# Patient Record
Sex: FEMALE | Race: WHITE | NOT HISPANIC OR LATINO | Employment: FULL TIME | URBAN - METROPOLITAN AREA
[De-identification: names, ages, dates, MRNs, and addresses within clinical notes are randomized per-mention and may not be internally consistent; named-entity substitution may affect disease eponyms.]

---

## 2018-01-05 ENCOUNTER — TELEPHONE (OUTPATIENT)
Dept: URGENT CARE | Facility: PHYSICIAN GROUP | Age: 28
End: 2018-01-05

## 2018-01-05 ENCOUNTER — OFFICE VISIT (OUTPATIENT)
Dept: URGENT CARE | Facility: PHYSICIAN GROUP | Age: 28
End: 2018-01-05

## 2018-01-05 VITALS
OXYGEN SATURATION: 97 % | DIASTOLIC BLOOD PRESSURE: 78 MMHG | SYSTOLIC BLOOD PRESSURE: 120 MMHG | TEMPERATURE: 99.5 F | RESPIRATION RATE: 16 BRPM | HEART RATE: 70 BPM

## 2018-01-05 DIAGNOSIS — R19.7 DIARRHEA, UNSPECIFIED TYPE: ICD-10-CM

## 2018-01-05 DIAGNOSIS — R11.0 NAUSEA: ICD-10-CM

## 2018-01-05 PROCEDURE — 99203 OFFICE O/P NEW LOW 30 MIN: CPT | Performed by: FAMILY MEDICINE

## 2018-01-05 RX ORDER — DEXTROAMPHETAMINE SACCHARATE, AMPHETAMINE ASPARTATE, DEXTROAMPHETAMINE SULFATE AND AMPHETAMINE SULFATE 2.5; 2.5; 2.5; 2.5 MG/1; MG/1; MG/1; MG/1
5 TABLET ORAL 2 TIMES DAILY
COMMUNITY

## 2018-01-05 RX ORDER — ONDANSETRON 4 MG/1
4 TABLET, ORALLY DISINTEGRATING ORAL EVERY 8 HOURS PRN
Qty: 6 TAB | Refills: 0 | Status: SHIPPED | OUTPATIENT
Start: 2018-01-05 | End: 2023-01-09

## 2018-01-05 NOTE — PROGRESS NOTES
Subjective:      Cristy Suero is a 27 y.o. female who presents with Cough (cough x2 wks)            2 weeks initial GI symptoms that have waxed and waned. +nausea, + diarrhea without blood in stool. Cough has improved. Nasal congestion has worsened recently. Intermittent subjective fever. Minimal relief with OTC medications. No other aggravating or alleviating factors.        No recent foreign travel, abx, or drinking from streams.     Review of Systems   Constitutional: Positive for malaise/fatigue. Negative for weight loss.   HENT: Negative for ear discharge and ear pain.    Eyes: Negative for discharge and redness.   Respiratory: Negative for shortness of breath and wheezing.    Gastrointestinal: Negative for abdominal pain.   Genitourinary: Negative for dysuria, frequency, hematuria and urgency.   Skin: Negative for itching and rash.     .  Medications, Allergies, and current problem list reviewed today in Epic       Objective:     /78   Pulse 70   Temp 37.5 °C (99.5 °F)   Resp 16   SpO2 97%      Physical Exam   Constitutional: She appears well-developed and well-nourished. No distress.   HENT:   Head: Normocephalic and atraumatic.   Right Ear: External ear normal.   Left Ear: External ear normal.   Nasal congestion     Eyes: Conjunctivae are normal.   Neck: Neck supple.   Cardiovascular: Normal rate, regular rhythm and normal heart sounds.    Pulmonary/Chest: Effort normal and breath sounds normal.   Abdominal: Soft. Bowel sounds are normal. There is no tenderness.   Lymphadenopathy:     She has no cervical adenopathy.   Neurological:   Speech is clear. Patient is appropriate and cooperative.     Skin: Skin is warm and dry. No rash noted.               Assessment/Plan:     1. Nausea  ondansetron (ZOFRAN ODT) 4 MG TABLET DISPERSIBLE   2. Diarrhea, unspecified type       Differential diagnosis, natural history, supportive care, and indications for immediate follow-up discussed at length.   Imodium  prn  ORT with pedialyte discussed

## 2018-01-05 NOTE — LETTER
January 9, 2018         Patient: Cristy Suero   YOB: 1990   Date of Visit: 1/5/2018           To Whom it May Concern:    Cristy Suero was seen in my clinic on 1/5/2018. I have been asked by Ms. Suero to provide further details of her illness in a letter. She has had diarrhea as well as upper respiratory symptoms. There is a high probability that these symptoms are caused by one of many viruses that are circulating in our community including influenza and norovirus. She is considered contagious until she has not had diarrhea or fever for at least 24 hours. Further, given these symptoms, I believe a long flight such as that scheduled would be very difficult to endure for her. In the interest of her comfort and the safety of the other passengers I would recommend no flying until the guidelines above are reached. I believe that postponing the flight until 1/12/2018 should give ample time for the symptoms to resolve.       Sincerely,           Ricardo Rodney M.D.  Electronically Signed

## 2018-01-05 NOTE — LETTER
January 5, 2018         Patient: Cristy Suero   YOB: 1990   Date of Visit: 1/5/2018           To Whom it May Concern:    Cristy Suero was seen in my clinic on 1/5/2018. No flying until 1/12/2018.      Sincerely,           Ricardo Rodney M.D.  Electronically Signed

## 2018-01-06 NOTE — TELEPHONE ENCOUNTER
This patient called for a note detailing why she is unable to fly. She states that her airline requests a diagnosis or more thorough explanation in her note. She also wonders if she is able to get progress notes from her visit? Thanks.

## 2018-01-12 ASSESSMENT — ENCOUNTER SYMPTOMS
ABDOMINAL PAIN: 0
WEIGHT LOSS: 0
EYE DISCHARGE: 0
WHEEZING: 0
SHORTNESS OF BREATH: 0
EYE REDNESS: 0

## 2023-01-09 ENCOUNTER — OFFICE VISIT (OUTPATIENT)
Dept: MEDICAL GROUP | Facility: MEDICAL CENTER | Age: 33
End: 2023-01-09
Payer: COMMERCIAL

## 2023-01-09 ENCOUNTER — HOSPITAL ENCOUNTER (OUTPATIENT)
Facility: MEDICAL CENTER | Age: 33
End: 2023-01-09
Attending: BEHAVIOR ANALYST
Payer: COMMERCIAL

## 2023-01-09 VITALS
HEIGHT: 66 IN | HEART RATE: 49 BPM | BODY MASS INDEX: 28.67 KG/M2 | OXYGEN SATURATION: 95 % | TEMPERATURE: 97.8 F | WEIGHT: 178.4 LBS | SYSTOLIC BLOOD PRESSURE: 94 MMHG | DIASTOLIC BLOOD PRESSURE: 68 MMHG | RESPIRATION RATE: 20 BRPM

## 2023-01-09 DIAGNOSIS — F41.8 DEPRESSION WITH ANXIETY: ICD-10-CM

## 2023-01-09 DIAGNOSIS — Z23 NEED FOR VACCINATION: ICD-10-CM

## 2023-01-09 DIAGNOSIS — Z00.00 WELLNESS EXAMINATION: ICD-10-CM

## 2023-01-09 DIAGNOSIS — R87.810 CERVICAL HIGH RISK HUMAN PAPILLOMAVIRUS (HPV) DNA TEST POSITIVE: ICD-10-CM

## 2023-01-09 DIAGNOSIS — Z11.51 SCREENING FOR HPV (HUMAN PAPILLOMAVIRUS): ICD-10-CM

## 2023-01-09 DIAGNOSIS — Z12.4 SCREENING FOR CERVICAL CANCER: ICD-10-CM

## 2023-01-09 DIAGNOSIS — L98.9 BUMPS ON SKIN: ICD-10-CM

## 2023-01-09 DIAGNOSIS — Z30.09 ENCOUNTER FOR COUNSELING REGARDING CONTRACEPTION: ICD-10-CM

## 2023-01-09 DIAGNOSIS — Z01.419 CERVICAL SMEAR, AS PART OF ROUTINE GYNECOLOGICAL EXAMINATION: ICD-10-CM

## 2023-01-09 DIAGNOSIS — O03.9 MISCARRIAGE: ICD-10-CM

## 2023-01-09 DIAGNOSIS — F98.8 ATTENTION DEFICIT DISORDER, UNSPECIFIED HYPERACTIVITY PRESENCE: ICD-10-CM

## 2023-01-09 LAB — AMBIGUOUS DTTM AMBI4: NORMAL

## 2023-01-09 PROCEDURE — 88175 CYTOPATH C/V AUTO FLUID REDO: CPT

## 2023-01-09 PROCEDURE — 87591 N.GONORRHOEAE DNA AMP PROB: CPT

## 2023-01-09 PROCEDURE — 90471 IMMUNIZATION ADMIN: CPT | Performed by: BEHAVIOR ANALYST

## 2023-01-09 PROCEDURE — 90686 IIV4 VACC NO PRSV 0.5 ML IM: CPT | Performed by: BEHAVIOR ANALYST

## 2023-01-09 PROCEDURE — 87491 CHLMYD TRACH DNA AMP PROBE: CPT

## 2023-01-09 PROCEDURE — 99204 OFFICE O/P NEW MOD 45 MIN: CPT | Mod: 25 | Performed by: BEHAVIOR ANALYST

## 2023-01-09 PROCEDURE — 87624 HPV HI-RISK TYP POOLED RSLT: CPT

## 2023-01-09 RX ORDER — SERTRALINE HYDROCHLORIDE 100 MG/1
200 TABLET, FILM COATED ORAL DAILY
COMMUNITY

## 2023-01-09 RX ORDER — BUPROPION HYDROCHLORIDE 150 MG/1
150 TABLET, EXTENDED RELEASE ORAL 2 TIMES DAILY
COMMUNITY

## 2023-01-09 ASSESSMENT — ENCOUNTER SYMPTOMS
DEPRESSION: 1
NERVOUS/ANXIOUS: 1
CHILLS: 0
INSOMNIA: 0
FEVER: 0

## 2023-01-09 ASSESSMENT — PATIENT HEALTH QUESTIONNAIRE - PHQ9: CLINICAL INTERPRETATION OF PHQ2 SCORE: 0

## 2023-01-09 NOTE — LETTER
[facility]  MARIUM Gillis.  [address]  [place] [address]  Fax: [phone]   Authorization for Release/Disclosure of   Protected Health Information   Name: ARNALDO GORDON : 1990 SSN: [id number]   Address: [facility] [place] [address] Phone:    [phone] (home) [phone] (work)   I authorize the entity listed below to release/disclose the PHI below to:   [facility]/JAIRON Gillis and JAIRON Gillis   Provider or Entity Name:     Address   City, State, Zip   Phone:      Fax:     Reason for request: continuity of care   Information to be released:    [  ] LAST COLONOSCOPY,  including any PATH REPORT and follow-up  [  ] LAST FIT/COLOGUARD RESULT [  ] LAST DEXA  [  ] LAST MAMMOGRAM  [  ] LAST PAP  [  ] LAST LABS [  ] RETINA EXAM REPORT  [  ] IMMUNIZATION RECORDS  [  ] Release all info      [  ] Check here and initial the line next to each item to release ALL health information INCLUDING  _____ Care and treatment for drug and / or alcohol abuse  _____ HIV testing, infection status, or AIDS  _____ Genetic Testing    DATES OF SERVICE OR TIME PERIOD TO BE DISCLOSED: _____________  I understand and acknowledge that:  * This Authorization may be revoked at any time by you in writing, except if your health information has already been used or disclosed.  * Your health information that will be used or disclosed as a result of you signing this authorization could be re-disclosed by the recipient. If this occurs, your re-disclosed health information may no longer be protected by State or Federal laws.  * You may refuse to sign this Authorization. Your refusal will not affect your ability to obtain treatment.  * This Authorization becomes effective upon signing and will  on (date) __________.      If no date is indicated, this Authorization will  one (1) year from the signature date.    Name: Arnaldo Gordon    Signature:   Date:      1/9/2023       PLEASE FAX REQUESTED RECORDS BACK TO: [phone]

## 2023-01-09 NOTE — PROGRESS NOTES
Subjective:     CC:   Chief Complaint   Patient presents with    [facility]     Discuss contraceptive, just miscarried 22 at 4 weeks, requesting a pap today       HPI:   Cristy Daniels is a 32 y.o. female with history of ADD and depression who presents to [facility] and have a pelvic exam.  She also requests a Pap smear today.    Problem   Miscarriage    She reports finding out she was pregnant 12/10, a few days less than 4 weeks after her LMP.  Pregnancy was confirmed by multiple home positive pregnancy test. About one week later,, she started bleeding and knew she miscarried. She tracks her periods very closely and has consistent length between cycles.   She had scheduled an appointment for [place] for an , however, by the time of her appointment she had already miscarried. She did still go to the Women's health clinic and an ultrasound was performed with no signs of pregnancy.   She reports continued bleeding, similar to her period flow, for a full two week. She stopped bleeding a few days ago.      Encounter for Counseling Regarding Contraception    She admits that she is not good about taking a pill daily.  She is interested in other forms of contraception.  She is not interested in IUD as she is afraid of potential complications.  She is also not interested in a vaginal ring or skin patch.  She is most interested in Nexplanon as she has heard good things about it from her friends.      Bumps On Skin    Three weeks ago she noticed 1 bump on her labia.  She then noted a few more over the next week.  The bumps have decreased in size now and she has not had any new bumps over the past 2 weeks.   Denies pain, discharge or itching.  She did her pubic area waxed just before she noticed the first bumps.  Denies history of vaginal warts or herpes.     Cervical high risk HPV (human papillomavirus) test positive    She states she has not had a Pap smear in about 8 years.  Reports that previously  cervical cancer screenings have been positive for HPV.  However, after her last Pap smear she was told she would not need another PAP for 3 to 5 years.      Depression With Anxiety    This is a chronic condition.  She has been on medications for many years  Current treatment with sertraline and wellbutrin for the last 5 years.   PHQ screen:  0  Suicidal ideation/homicidal ideation: none  Therapy/counseling: would like to start.  Requesting a referral now that she has better insurance.   Medications: 100 mg sertraline daily and 50 mg wellbutrin twice daily  Improving/worsening: She reports she is very stable.        Add (Attention Deficit Disorder)    Diagnosed as a child around 9 years old.   Taking Adderall about 2x a month-when she needs to stay focused to complete something. Was taking more previously when she was in school.  Helps with focus.   The Adderall has been prescribed by her previous PCP at [facility].     Insomnia (Resolved)            She  has a past medical history of Depression, Insomnia (8/11/2011), and Miscarriage (12/17/2022).  She  has a past surgical history that includes tonsillectomy.    Family History   Problem Relation Age of Onset    No Known Problems Mother     Hypertension Father     Psychiatric Illness Sister     Psychiatric Illness Sister     Psychiatric Illness Brother         depression    Diabetes Brother         type 1    Suicide Attempts Brother     Suicide Attempts Brother     Breast Cancer Neg Hx     Colorectal Cancer Neg Hx     Ovarian Cancer Neg Hx     Tubal Cancer Neg Hx     Peritoneal Cancer Neg Hx        Social History     Socioeconomic History    Marital status: Single     Spouse name: Not on file    Number of children: Not on file    Years of education: Not on file    Highest education level: Not on file   Occupational History    Not on file   Tobacco Use    Smoking status: Never    Smokeless tobacco: Never   Vaping Use    Vaping Use: Never used  "  Substance and Sexual Activity    Alcohol use: Yes     Comment: Socially    Drug use: No    Sexual activity: Yes     Partners: Male     Birth control/protection: Condom     Comment: in relationship x 3 years   Other Topics Concern    Not on file   Social History Narrative    Not on file     Social Determinants of Health     Financial Resource Strain: Not on file   Food Insecurity: Not on file   Transportation Needs: Not on file   Physical Activity: Not on file   Stress: Not on file   Social Connections: Not on file   Intimate Partner Violence: Not on file   Housing Stability: Not on file       Patient Active Problem List    Diagnosis Date Noted    Miscarriage 01/09/2023    Encounter for counseling regarding contraception 01/09/2023    Bumps on skin 01/09/2023    Cervical high risk HPV (human papillomavirus) test positive 01/30/2012    Abnormal finding on Pap smear, ASCUS 01/30/2012    Trichomonas exposure 01/30/2012    Depression with anxiety 08/11/2011    ADD (attention deficit disorder) 10/11/2010         Current Outpatient Medications   Medication Sig Dispense Refill    sertraline (ZOLOFT) 100 MG Tab Take 200 mg by mouth every day.      buPROPion SR (WELLBUTRIN-SR) 150 MG TABLET SR 12 HR sustained-release tablet Take 150 mg by mouth 2 times a day.      amphetamine-dextroamphetamine (ADDERALL) 10 MG Tab Take 5 mg by mouth 2 times a day.       No current facility-administered medications for this visit.     No Known Allergies    Review of Systems   Review of Systems   Constitutional:  Negative for chills and fever.   Cardiovascular:  Negative for chest pain.   Genitourinary:         Bumps on labia   Psychiatric/Behavioral:  Positive for depression. The patient is nervous/anxious. The patient does not have insomnia.       Objective:     BP 94/68 (BP Location: Left arm, Patient Position: Sitting, BP Cuff Size: Adult long)   Pulse (!) 49   Temp 36.6 °C (97.8 °F) (Temporal)   Resp 20   Ht 1.676 m (5' 6\") Comment: " Pt reported  Wt 80.9 kg (178 lb 6.4 oz)   LMP 2022   SpO2 95%   BMI 28.79 kg/m²   Body mass index is 28.79 kg/m².      Physical Exam:  Physical Exam  Constitutional:       Appearance: Normal appearance.   Cardiovascular:      Rate and Rhythm: Normal rate and regular rhythm.      Pulses: Normal pulses.      Heart sounds: Normal heart sounds.   Pulmonary:      Effort: Pulmonary effort is normal.      Breath sounds: Normal breath sounds.   Musculoskeletal:      Cervical back: Normal range of motion and neck supple.   Neurological:      Mental Status: She is alert.   : Perineum and external genitalia normal without rash. No visualized lesions or warts. A few bumps under skin that are normal/physiologic for that tissue, most likely hair follicles. Vagina with scant discharge. Cervix without visible lesions or discharge. Bimanual exam without adnexal masses or cervical motion tenderness.    A chaperone was offered to the patient during today's exam. Chaperone name: Opal OCONNELL was present.    Assessment and Plan:     1. Need for vaccination  INFLUENZA VACCINE QUAD INJ (PF)      2. Miscarriage        3. Attention deficit disorder, unspecified hyperactivity presence  Referral to Psychology      4. Depression with anxiety  Referral to Psychology      5. Encounter for counseling regarding contraception        6. Bumps on skin        7. Wellness examination  TSH WITH REFLEX TO FT4    T.PALLIDUM AB ESTEPHANIE (SCREENING)    CBC WITHOUT DIFFERENTIAL    Comp Metabolic Panel    Lipid Profile    HIV AG/AB COMBO ASSAY SCREENING    HCV Scrn ( 4930-9258 1xLife)      8. Screening for cervical cancer  THINPREP PAP W/HPV AND CTNG      9. Screening for HPV (human papillomavirus)  THINPREP PAP W/HPV AND CTNG      10. Cervical smear, as part of routine gynecological examination  THINPREP PAP W/HPV AND CTNG      11. Cervical high risk HPV (human papillomavirus) test positive            1. Need for vaccination  Pt desires vaccination.   Risks and benefits discussed.  No contraindications today.  Vaccine given.  Follow-up precautions discussed.  - INFLUENZA VACCINE QUAD INJ (PF)    2. Miscarriage  -Patient reports symptoms of complete miscarriage without complication.  - Focusing on initiating contraception as she does not wish to have a pregnancy.    3. Attention deficit disorder, unspecified hyperactivity presence  - Chronic, stable with occasional Adderall use.  - Referral to Psychology    4. Depression with anxiety  - Chronic, in partial remission.  She denies any symptoms of depression or SI/HI today.  Therefore, we will continue the current regimen.  She is interested in getting started with counseling.  - Referral to Psychology    5. Encounter for counseling regarding contraception  -After discussing various methods of contraception the Nexplanon does seem to be a good fit for her.  She will be scheduled with one of my colleagues, Dr. Spencer, for consultation.   -She was advised to avoid unprotected sex until adequate contraception method is obtained.     6. Bumps on skin  -Physiologic bumps on labia not associated with any concerning lesions or warts.    7. Wellness examination  -She states she has not had wellness labs in several years.  She also requests STD testing.   - TSH WITH REFLEX TO FT4; Future  - T.PALLIDUM AB ESTEPHANIE (SCREENING); Future  - CBC WITHOUT DIFFERENTIAL; Future  - Comp Metabolic Panel; Future  - Lipid Profile; Future  - HIV AG/AB COMBO ASSAY SCREENING; Future  - HCV Scrn ( 6401-8527 1xLife); Future    8. Screening for cervical cancer  9. Screening for HPV (human papillomavirus)  10. Cervical smear, as part of routine gynecological examination  - THINPREP PAP W/HPV AND CTNG; Future    11. Cervical high risk HPV (human papillomavirus) test positive  -Cervical cancer screening performed        Follow-up: Return in about 3 months (around 2023) for Lab Results.  Follow-up as soon as possible with Dr. Spencer for Nexplanon  consultation.

## 2023-01-10 LAB
C TRACH DNA GENITAL QL NAA+PROBE: NEGATIVE
CYTOLOGY REG CYTOL: NORMAL
HPV HR 12 DNA CVX QL NAA+PROBE: NEGATIVE
HPV16 DNA SPEC QL NAA+PROBE: NEGATIVE
HPV18 DNA SPEC QL NAA+PROBE: NEGATIVE
N GONORRHOEA DNA GENITAL QL NAA+PROBE: NEGATIVE
SPECIMEN SOURCE: NORMAL
SPECIMEN SOURCE: NORMAL

## 2023-01-30 ENCOUNTER — PATIENT MESSAGE (OUTPATIENT)
Dept: MEDICAL GROUP | Facility: MEDICAL CENTER | Age: 33
End: 2023-01-30

## 2023-01-30 ENCOUNTER — OFFICE VISIT (OUTPATIENT)
Dept: MEDICAL GROUP | Facility: MEDICAL CENTER | Age: 33
End: 2023-01-30
Payer: COMMERCIAL

## 2023-01-30 VITALS
DIASTOLIC BLOOD PRESSURE: 64 MMHG | OXYGEN SATURATION: 95 % | HEIGHT: 66 IN | BODY MASS INDEX: 28.61 KG/M2 | HEART RATE: 50 BPM | TEMPERATURE: 97 F | WEIGHT: 178 LBS | SYSTOLIC BLOOD PRESSURE: 102 MMHG

## 2023-01-30 DIAGNOSIS — Z30.09 FAMILY PLANNING: ICD-10-CM

## 2023-01-30 PROCEDURE — 99213 OFFICE O/P EST LOW 20 MIN: CPT | Performed by: FAMILY MEDICINE

## 2023-01-30 RX ORDER — NORELGESTROMIN AND ETHINYL ESTRADIOL 35; 150 UG/MG; UG/MG
1 PATCH TRANSDERMAL
Qty: 12 PATCH | Refills: 3 | Status: SHIPPED | OUTPATIENT
Start: 2023-01-30

## 2023-01-30 NOTE — PROGRESS NOTES
"Subjective:     CC: The encounter diagnosis was Family planning.    HPI: Patient is a 32 y.o. female established patient who presents today to discuss the following.       Family planning  The patient has been on OCPs but she has a really hard time remembering everyday.   , had miscarriage, pregnancy was unplanned. She is interested in longer acting form of birth control.       Past Medical History:   Diagnosis Date    Depression     Insomnia 2011    Miscarriage 2022       Social History     Tobacco Use    Smoking status: Never    Smokeless tobacco: Never   Vaping Use    Vaping Use: Never used   Substance Use Topics    Alcohol use: Yes     Comment: Socially    Drug use: No       Current Outpatient Medications Ordered in Epic   Medication Sig Dispense Refill    norelgestromin-ethinyl estradiol (ORTHO EVRA) 150-35 MCG/24HR patch Place 1 Patch on the skin every 7 days. 12 Patch 3    sertraline (ZOLOFT) 100 MG Tab Take 200 mg by mouth every day.      buPROPion SR (WELLBUTRIN-SR) 150 MG TABLET SR 12 HR sustained-release tablet Take 150 mg by mouth 2 times a day.      amphetamine-dextroamphetamine (ADDERALL) 10 MG Tab Take 5 mg by mouth 2 times a day.       No current [facility]-ordered facility-administered medications on file.       Allergies:  Patient has no known allergies.    Health Maintenance: Completed      Objective:       Exam:  /64 (BP Location: Left arm, Patient Position: Sitting, BP Cuff Size: Adult long)   Pulse (!) 50   Temp 36.1 °C (97 °F) (Temporal)   Ht 1.676 m (5' 6\")   Wt 80.7 kg (178 lb)   SpO2 95%   BMI 28.73 kg/m²  Body mass index is 28.73 kg/m².    General: Normal appearing. No distress.  HEAD: NCAT  EYES: conjunctiva clear, lids without ptosis, pupils equal and reactive to light  Neck:  Normal ROM  Pulmonary: Normal effort. Normal respiratory rate.  Cardiovascular: Well perfused. No LE edema  Neurologic: Grossly normal, no focal deficits  Skin: Warm and dry.  No obvious " lesions.  Musculoskeletal: Normal gait and station.   Psych: Normal mood and affect. Alert and oriented x3. Judgment and insight is normal.       Assessment & Plan:     32 y.o. female with the following -     1. Family planning  New to discuss. Reviewed all forms of birth control. She would like to move forward with nexplanon. However, interested ortho evra patch while we wait for delivery.   - norelgestromin-ethinyl estradiol (ORTHO EVRA) 150-35 MCG/24HR patch; Place 1 Patch on the skin every 7 days.  Dispense: 12 Patch; Refill: 3      Return if symptoms worsen or fail to improve.    Please note that this dictation was created using voice recognition software. I have made every reasonable attempt to correct obvious errors, but I expect that there are errors of grammar and possibly content that I did not discover before finalizing the note.

## 2023-02-14 NOTE — PATIENT COMMUNICATION
Spoke with [employer] rep. Provided them with ins info for [facility]. They will submit a payment claim.

## 2023-02-23 NOTE — PATIENT COMMUNICATION
Phone Number Called: [phone] (home) [phone] (work)    Call outcome:  LM to pt informing that [facility] needs for her to call [phone] so we can get the nexplanon on time. Pt to call us if further questions.  I also sent pt a mess via my chart.

## 2023-03-29 ENCOUNTER — OFFICE VISIT (OUTPATIENT)
Dept: URGENT CARE | Facility: PHYSICIAN GROUP | Age: 33
End: 2023-03-29
Payer: COMMERCIAL

## 2023-03-29 VITALS
HEART RATE: 47 BPM | TEMPERATURE: 97.3 F | DIASTOLIC BLOOD PRESSURE: 78 MMHG | HEIGHT: 66 IN | RESPIRATION RATE: 14 BRPM | BODY MASS INDEX: 27.64 KG/M2 | WEIGHT: 172 LBS | OXYGEN SATURATION: 100 % | SYSTOLIC BLOOD PRESSURE: 116 MMHG

## 2023-03-29 DIAGNOSIS — J02.9 PHARYNGITIS, UNSPECIFIED ETIOLOGY: ICD-10-CM

## 2023-03-29 DIAGNOSIS — J22 LRTI (LOWER RESPIRATORY TRACT INFECTION): ICD-10-CM

## 2023-03-29 LAB
INT CON NEG: NORMAL
INT CON POS: NORMAL
S PYO AG THROAT QL: NORMAL

## 2023-03-29 PROCEDURE — 87880 STREP A ASSAY W/OPTIC: CPT | Performed by: NURSE PRACTITIONER

## 2023-03-29 PROCEDURE — 99213 OFFICE O/P EST LOW 20 MIN: CPT | Performed by: NURSE PRACTITIONER

## 2023-03-29 RX ORDER — AZITHROMYCIN 250 MG/1
TABLET, FILM COATED ORAL
Qty: 6 TABLET | Refills: 0 | Status: SHIPPED
Start: 2023-03-29 | End: 2023-07-24

## 2023-03-29 ASSESSMENT — ENCOUNTER SYMPTOMS
FEVER: 0
CHILLS: 0
CARDIOVASCULAR NEGATIVE: 1
SORE THROAT: 1
RHINORRHEA: 1
NEUROLOGICAL NEGATIVE: 1
MUSCULOSKELETAL NEGATIVE: 1
COUGH: 1
EYES NEGATIVE: 1
GASTROINTESTINAL NEGATIVE: 1

## 2023-03-30 NOTE — PROGRESS NOTES
"Subjective:   Cristy Daniels is a 33 y.o. female who presents for Cough (Cough for over a week accompanied with scratchy throat, exposed to strep. )      Cough  This is a new problem. Episode onset: one week. The problem has been gradually worsening. The problem occurs constantly. The cough is Productive of sputum. Associated symptoms include ear congestion, nasal congestion, postnasal drip, rhinorrhea and a sore throat. Pertinent negatives include no chills or fever. Nothing aggravates the symptoms. She has tried OTC cough suppressant for the symptoms. The treatment provided no relief. Her past medical history is significant for asthma.     Review of Systems   Constitutional:  Positive for malaise/fatigue. Negative for chills and fever.   HENT:  Positive for congestion, postnasal drip, rhinorrhea and sore throat.    Eyes: Negative.    Respiratory:  Positive for cough.    Cardiovascular: Negative.    Gastrointestinal: Negative.    Musculoskeletal: Negative.    Skin: Negative.    Neurological: Negative.      Medications, Allergies, and current problem list reviewed today in Epic.     Objective:     /78   Pulse (!) 47   Temp 36.3 °C (97.3 °F)   Resp 14   Ht 1.676 m (5' 6\")   Wt 78 kg (172 lb)   SpO2 100%     Physical Exam  Vitals reviewed.   Constitutional:       General: She is not in acute distress.     Appearance: Normal appearance. She is normal weight. She is ill-appearing.   HENT:      Head: Normocephalic and atraumatic.      Right Ear: Tympanic membrane, ear canal and external ear normal.      Left Ear: Tympanic membrane, ear canal and external ear normal.      Nose: Congestion present.      Mouth/Throat:      Mouth: Mucous membranes are moist.      Pharynx: Oropharynx is clear. Posterior oropharyngeal erythema present.   Eyes:      Extraocular Movements: Extraocular movements intact.      Conjunctiva/sclera: Conjunctivae normal.      Pupils: Pupils are equal, round, and reactive to light. "   Cardiovascular:      Rate and Rhythm: Normal rate and regular rhythm.   Pulmonary:      Effort: Pulmonary effort is normal.      Breath sounds: Normal breath sounds.   Abdominal:      General: Abdomen is flat.      Palpations: Abdomen is soft.   Musculoskeletal:         General: Normal range of motion.      Cervical back: Normal range of motion and neck supple. No tenderness.   Lymphadenopathy:      Cervical: No cervical adenopathy.   Skin:     General: Skin is warm and dry.      Capillary Refill: Capillary refill takes less than 2 seconds.   Neurological:      General: No focal deficit present.      Mental Status: She is alert.   Psychiatric:         Mood and Affect: Mood normal.         Behavior: Behavior normal.       Lab Results/POC Test Results   Results for orders placed or performed in visit on 03/29/23   POCT Rapid Strep A   Result Value Ref Range    Rapid Strep Screen NEG     Internal Control Positive Valid     Internal Control Negative Valid            Assessment/Plan:     Diagnosis and associated orders:     1. LRTI (lower respiratory tract infection)  azithromycin (ZITHROMAX) 250 MG Tab      2. Pharyngitis, unspecified etiology  POCT Rapid Strep A         Comments/MDM:     Due to duration of symptoms, and failure of OTC therapies- ABX was written to tx. For bacterial etiology for lower respiratory tract infection.  Continue OTC supportive therapies- Flonase, OTC allergy meds, avoid night time dairy. Increase fluids. Humidification.   Patient given precautionary s/sx that mandate immediate follow up and evaluation in the ED. Advised of risks of not doing so.    DDX, Supportive care, and indications for immediate follow-up discussed with patient.    Instructed to return to clinic or nearest emergency department if we are not available for any change in condition, further concerns, or worsening of symptoms.    The patient demonstrated a good understanding and agreed with the treatment plan            Differential diagnosis, natural history, supportive care, and indications for immediate follow-up discussed.    Advised the patient to follow-up with the primary care physician for recheck, reevaluation, and consideration of further management.    Please note that this dictation was created using voice recognition software. I have made a reasonable attempt to correct obvious errors, but I expect that there are errors of grammar and possibly content that I did not discover before finalizing the note.    This note was electronically signed by ADONIS Lucero

## 2023-04-17 ENCOUNTER — APPOINTMENT (OUTPATIENT)
Dept: MEDICAL GROUP | Facility: MEDICAL CENTER | Age: 33
End: 2023-04-17
Payer: COMMERCIAL

## 2023-07-24 ENCOUNTER — OCCUPATIONAL MEDICINE (OUTPATIENT)
Dept: URGENT CARE | Facility: PHYSICIAN GROUP | Age: 33
End: 2023-07-24
Payer: COMMERCIAL

## 2023-07-24 VITALS
WEIGHT: 80.55 LBS | TEMPERATURE: 98.1 F | HEIGHT: 66 IN | OXYGEN SATURATION: 100 % | DIASTOLIC BLOOD PRESSURE: 62 MMHG | BODY MASS INDEX: 12.95 KG/M2 | HEART RATE: 70 BPM | SYSTOLIC BLOOD PRESSURE: 100 MMHG | RESPIRATION RATE: 16 BRPM
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
## 2023-07-24 DIAGNOSIS — S61.052A CAT BITE OF LEFT THUMB, INITIAL ENCOUNTER: ICD-10-CM

## 2023-07-24 PROCEDURE — 3078F DIAST BP <80 MM HG: CPT | Performed by: FAMILY MEDICINE

## 2023-07-24 PROCEDURE — 90715 TDAP VACCINE 7 YRS/> IM: CPT | Performed by: FAMILY MEDICINE

## 2023-07-24 PROCEDURE — 3074F SYST BP LT 130 MM HG: CPT | Performed by: FAMILY MEDICINE

## 2023-07-24 PROCEDURE — 90471 IMMUNIZATION ADMIN: CPT | Performed by: FAMILY MEDICINE

## 2023-07-24 PROCEDURE — 99213 OFFICE O/P EST LOW 20 MIN: CPT | Mod: 25 | Performed by: FAMILY MEDICINE

## 2023-07-24 RX ORDER — AMOXICILLIN AND CLAVULANATE POTASSIUM 875; 125 MG/1; MG/1
1 TABLET, FILM COATED ORAL 2 TIMES DAILY
Qty: 14 TABLET | Refills: 0 | Status: SHIPPED | OUTPATIENT
Start: 2023-07-24 | End: 2023-07-31

## 2023-07-24 NOTE — LETTER
[facility] [address] [place] [address]  Phone:  [phone] - Fax:  [phone]   [facility] Progress Report and Disability Certification  Date of Service: 7/24/2023   No Show:  No  Date / Time of Next Visit:     Claim Information   Patient Name: Cristy Daniels  Claim Number:     Employer:   [employer] Date of Injury: 7/24/2023     Insurer / TPA:    ID / SSN:     Occupation:   Diagnosis: The encounter diagnosis was Cat bite of left thumb, initial encounter.    Medical Information   Related to Industrial Injury? Yes    Subjective Complaints:  Date of injury 7/24/2023.  33-year-old  presents with chief complaint of puncture wound to the left sustained today, 7/24/2023, when performing an oral exam on feline and sustaining 2 puncture wounds to the left thumb.  Reports initial bleeding and pain which is resolved.  No suspicion of retained foreign bodies and no missing teeth from the feline reported.  No reported limiting numbness tingling or weakness or loss of sensation in the left thumb. tetanus immunization out of date.   Objective Findings: Left distal thumb: Thumb pad and dorsal puncture wound, no foreign body visualized or palpated, no bony point tenderness, no active bleeding, no limiting tenderness to palpation, full range of motion throughout motor strength 5 out of 5 throughout in the left thumb to flexion and extension, neurovascular intact throughout   Pre-Existing Condition(s):     Assessment:   Initial Visit    Status: Discharged /  MMI  Permanent Disability:No    Plan: Medication  Comments:Tetanus immunization    Diagnostics:   Comments:Not applicable    Comments:       Disability Information   Status: Released to Full Duty    From:     Through:   Restrictions are:     Physical Restrictions   Sitting:    Standing:    Stooping:    Bending:      Squatting:    Walking:    Climbing:    Pushing:      Pulling:     Other:    Reaching Above Shoulder (L):   Reaching Above Shoulder (R):       Reaching Below Shoulder (L):    Reaching Below Shoulder (R):      Not to exceed Weight Limits   Carrying(hrs):   Weight Limit(lb):   Lifting(hrs):   Weight  Limit(lb):     Comments:      Repetitive Actions   Hands: i.e. Fine Manipulations from Grasping:     Feet: i.e. Operating Foot Controls:     Driving / Operate Machinery:     Health Care Provider’s Original or Electronic Signature  Stewart Calzada M.D. Health Care Provider’s Original or Electronic Signature    Brian Eli DO MPH     Clinic Name / Location: [address] [address] Clinic Phone Number: Dept: [phone]   Appointment Time: 1:25 Pm Visit Start Time: 1:49 PM   Check-In Time:  1:35 Pm Visit Discharge Time:  2:26 PM   Original-Treating Physician or Chiropractor    Page 2-Insurer/TPA    Page 3-Employer    Page 4-Employee

## 2023-07-24 NOTE — PROGRESS NOTES
Subjective:   Cristy Daniels is a 33 y.o. female who presents for Hand Injury ( New DOI: 7/24/23  Left thumb bite from a cat.   Broke the skin so she wanted to get it checked out.  Last tdap 2014)    Date of injury 7/24/2023.  33-year-old  presents with chief complaint of puncture wound to the left sustained today, 7/24/2023, when performing an oral exam on feline and sustaining 2 puncture wounds to the left thumb.  Reports initial bleeding and pain which is resolved.  No suspicion of retained foreign bodies and no missing teeth from the feline reported.  No reported limiting numbness tingling or weakness or loss of sensation in the left thumb. tetanus immunization out of date.   See the C4 and D39 form    Hand Injury      PMH:  has a past medical history of Depression, Insomnia (8/11/2011), and Miscarriage (12/17/2022).  MEDS:   Current Outpatient Medications:     amoxicillin-clavulanate (AUGMENTIN) 875-125 MG Tab, Take 1 Tablet by mouth 2 times a day for 7 days., Disp: 14 Tablet, Rfl: 0    sertraline (ZOLOFT) 100 MG Tab, Take 200 mg by mouth every day., Disp: , Rfl:     buPROPion SR (WELLBUTRIN-SR) 150 MG TABLET SR 12 HR sustained-release tablet, Take 150 mg by mouth 2 times a day., Disp: , Rfl:     amphetamine-dextroamphetamine (ADDERALL) 10 MG Tab, Take 5 mg by mouth 2 times a day., Disp: , Rfl:     norelgestromin-ethinyl estradiol (ORTHO EVRA) 150-35 MCG/24HR patch, Place 1 Patch on the skin every 7 days., Disp: 12 Patch, Rfl: 3  ALLERGIES: No Known Allergies  SURGHX:   Past Surgical History:   Procedure Laterality Date    TONSILLECTOMY       SOCHX:  reports that she has never smoked. She has never used smokeless tobacco. She reports current alcohol use. She reports that she does not use drugs.  FH:   Family History   Problem Relation Age of Onset    No Known Problems Mother     Hypertension Father     Psychiatric Illness Sister     Psychiatric Illness Sister     Psychiatric Illness Brother          "depression    Diabetes Brother         type 1    Suicide Attempts Brother     Suicide Attempts Brother     Breast Cancer Neg Hx     Colorectal Cancer Neg Hx     Ovarian Cancer Neg Hx     Tubal Cancer Neg Hx     Peritoneal Cancer Neg Hx      Review of Systems   All other systems reviewed and are negative.       Objective:   /62 (BP Location: Right arm, Patient Position: Sitting, BP Cuff Size: Adult)   Pulse 70   Temp 36.7 °C (98.1 °F) (Temporal)   Resp 16   Ht 1.676 m (5' 6\")   Wt 36.5 kg (80 lb 8.8 oz)   SpO2 100%   BMI 13.00 kg/m²   Physical Exam  Vitals and nursing note reviewed.   Constitutional:       General: She is not in acute distress.     Appearance: She is well-developed.   HENT:      Head: Normocephalic and atraumatic.      Right Ear: External ear normal.      Left Ear: External ear normal.      Nose: Nose normal.      Mouth/Throat:      Mouth: Mucous membranes are moist.   Eyes:      Conjunctiva/sclera: Conjunctivae normal.   Cardiovascular:      Rate and Rhythm: Normal rate.   Pulmonary:      Effort: Pulmonary effort is normal. No respiratory distress.      Breath sounds: Normal breath sounds.   Abdominal:      General: There is no distension.   Musculoskeletal:         General: Normal range of motion.   Skin:     General: Skin is warm and dry.   Neurological:      General: No focal deficit present.      Mental Status: She is alert and oriented to person, place, and time. Mental status is at baseline.      Gait: Gait (gait at baseline) normal.   Psychiatric:         Judgment: Judgment normal.       Left distal thumb: Thumb pad and dorsal puncture wound, no foreign body visualized or palpated, no bony point tenderness, no active bleeding, no limiting tenderness to palpation, full range of motion throughout motor strength 5 out of 5 throughout in the left thumb to flexion and extension, neurovascular intact throughout   Assessment/Plan:   1. Cat bite of left thumb, initial encounter  - Tdap " =>6yo IM  - amoxicillin-clavulanate (AUGMENTIN) 875-125 MG Tab; Take 1 Tablet by mouth 2 times a day for 7 days.  Dispense: 14 Tablet; Refill: 0    See the C4 and D39 form.  Tetanus immunization.  Wound care instructions and precautions.  Released from care to full duty.    Medical Decision Making/Course:  In the course of preparing for this visit with review of the pertinent past medical history, recent and past clinic visits, current medications, and performing chart, immunization, medical history and medication reconciliation, and in the further course of obtaining the current history pertinent to the clinic visit today, performing an exam and evaluation, ordering and independently evaluating labs, tests  , and/or procedures, prescribing any recommended new medications as noted above including tetanus immunization, providing any pertinent counseling and education and recommending further coordination of care and recommendations for symptomatic and supportive measures, at least  22 minutes of total time were spent during this encounter.        Please note that this dictation was created using voice recognition software. I have worked with consultants from the vendor as well as technical experts from [facility] to optimize the interface. I have made every reasonable attempt to correct obvious errors, but I expect that there are errors of grammar and possibly content that I did not discover before finalizing the note.

## 2023-07-24 NOTE — LETTER
"EMPLOYEE’S CLAIM FOR COMPENSATION/ REPORT OF INITIAL TREATMENT  FORM C-4  PLEASE TYPE OR PRINT    EMPLOYEE’S CLAIM - PROVIDE ALL INFORMATION REQUESTED   First Name  Cristy Last Name  Jeremiah Birthdate                    1990                Sex  female Claim Number (Insurer’s Use Only)   Home Address  [address] Age  33 y.o. Height  1.676 m (5' 6\") Weight  36.5 kg (80 lb 8.8 oz) [facility]     [place] Zip  [address] Telephone  [phone] (home) [phone] (work)   Mailing Address  [address] [place] Zip  [address] Primary Language Spoken  English    INSURER   THIRD-PARTY         Employee's Occupation (Job Title) When Injury or Occupational Disease Occurred      Employer's Name/Company Name     Telephone      Office Mail Address (Number and Street)          Date of Injury  7/24/2023               Hours Injury  12:00 PM Date Employer Notified  7/24/2023 Last Day of Work after Injury or Occupational Disease  7/24/2023 Supervisor to Whom Injury     Reported  Jorge Aldridge/Zonia Anand   Address or Location of Accident (if applicable)  Work [1]   What were you doing at the time of accident? (if applicable)  oral exam on feline    How did this injury or occupational disease occur? (Be specific and answer in detail. Use additional sheet if necessary)  Feline bit during oral exam   If you believe that you have an occupational disease, when did you first have knowledge of the disability and its relationship to your employment?  NA Witnesses to the Accident (if applicable)  Niles Lara      Nature of Injury or Occupational Disease  Laceration  Part(s) of Body Injured or Affected  Thumb (L), N/A, N/A    I CERTIFY THAT THE ABOVE IS TRUE AND CORRECT TO T HE BEST OF MY KNOWLEDGE AND THAT I HAVE PROVIDED THIS INFORMATION IN ORDER TO OBTAIN THE BENEFITS OF [place] AND " [place], OR [place]).  I HEREBY AUTHORIZE ANY PHYSICIAN, CHIROPRACTOR, SURGEON, PRACTITIONER OR ANY OTHER PERSON, ANY HOSPITAL, INCLUDING [facility] OR [facility], ANY  MEDICAL SERVICE ORGANIZATION, ANY INSURANCE COMPANY, OR OTHER INSTITUTION OR ORGANIZATION TO RELEASE TO EACH OTHER, ANY MEDICAL OR OTHER INFORMATION, INCLUDING BENEFITS PAID OR PAYABLE, PERTINENT TO THIS INJURY OR DISEASE, EXCEPT INFORMATION RELATIVE TO DIAGNOSIS, TREATMENT AND/OR COUNSELING FOR AIDS, PSYCHOLOGICAL CONDITIONS, ALCOHOL OR CONTROLLED SUBSTANCES, FOR WHICH I MUST GIVE SPECIFIC AUTHORIZATION.  A PHOTOSTAT OF THIS AUTHORIZATION SHALL BE VALID AS THE ORIGINAL.       Date   Place Employee’s Original or  *Electronic Signature   THIS REPORT MUST BE COMPLETED AND MAILED WITHIN 3 WORKING DAYS OF TREATMENT   Place  [facility]  Name of Facility  [facility]   Date  7/24/2023 Diagnosis and Description of Injury or Occupational Disease  (S61.052A,  W55.01XA) Cat bite of left thumb, initial encounter Is there evidence that the injured employee was under the influence of alcohol and/or another controlled substance at the time of accident?  ? No ? Yes (if yes, please explain)   Hour  1:49 PM   The encounter diagnosis was Cat bite of left thumb, initial encounter. No   Treatment  Tetanus immunization, medication  Have you advised the patient to remain off work five days or     more?    X-Ray Findings    Comments:Not applicable   ? Yes Indicate dates:   From   To      From information given by the employee, together with medical evidence, can        you directly connect this injury or occupational disease as job incurred?  Yes ? No If no, is the injured employee capable of:  ? full duty  Yes ? modified duty      Is additional medical care by a physician indicated?  No If modified duty, specify any limitations / restrictions      Do you know of any previous  "injury or disease contributing to this condition or occupational disease?  ? Yes ? No (Explain if yes)                          No   Date  7/24/2023 Print Health Care Provider's  Name  Stewart Calzada M.D. I certify that the employer’s copy of  this form was delivered to the employer on:   Address  [address] Insurer’s Use Only     [place] Zip  [address]    Provider’s Tax ID Number  [id number] Telephone  Dept: [phone]             Health Care Provider’s Original or Electronic Signature  e-SignROSE, STEWART ROBISON M.D. Degree (MD,DO, DC,PAEmilyC,APRN)        * Complete and attach Release of Information (Form C-4A) when injured employee signs C-4 Form electronically  ORIGINAL - TREATING HEALTHCARE PROVIDER PAGE 2 - INSURER/TPA PAGE 3 - EMPLOYER PAGE 4 - EMPLOYEE             Form C-4 (rev.08/21)           BRIEF DESCRIPTION OF RIGHTS AND BENEFITS  (Pursuant to [place] 616C.[place])    Notice of Injury or Occupational Disease (Incident Report Form C-1): If an injury or occupational disease (OD) arises out of and in the course of employment, you must provide written notice to your employer as soon as practicable, but no later than 7 days after the accident or OD. Your employer shall maintain a sufficient supply of the required forms.    Claim for Compensation (Form C-4): If medical treatment is sought, the form C-4 is available at the place of initial treatment. A completed \"Claim for Compensation\" (Form C-4) must be filed within 90 days after an accident or OD. The treating physician or chiropractor must, within 3 working days after treatment, complete and mail to the employer, the employer's insurer and third-party , the Claim for Compensation.    Medical Treatment: If you require medical treatment for your on-the-job injury or OD, you may be required to select a physician or chiropractor from a list provided by your workers’ compensation insurer, if it has contracted with an Organization " for Managed Care (MCO) or Preferred Provider Organization (PPO) or providers of health care. If your employer has not entered into a contract with an MCO or PPO, you may select a physician or chiropractor from the Panel of Physicians and Chiropractors. Any medical costs related to your industrial injury or OD will be paid by your insurer.    Temporary Total Disability (TTD): If your doctor has certified that you are unable to work for a period of at least 5 consecutive days, or 5 cumulative days in a 20-day period, or places restrictions on you that your employer does not accommodate, you may be entitled to TTD compensation.    Temporary Partial Disability (TPD): If the wage you receive upon reemployment is less than the compensation for TTD to which you are entitled, the insurer may be required to pay you TPD compensation to make up the difference. TPD can only be paid for a maximum of 24 months.    Permanent Partial Disability (PPD): When your medical condition is stable and there is an indication of a PPD as a result of your injury or OD, within 30 days, your insurer must arrange for an evaluation by a rating physician or chiropractor to determine the degree of your PPD. The amount of your PPD award depends on the date of injury, the results of the PPD evaluation, your age and wage.    Permanent Total Disability (PTD): If you are medically certified by a treating physician or chiropractor as permanently and totally disabled and have been granted a PTD status by your insurer, you are entitled to receive monthly benefits not to exceed 66 2/3% of your average monthly wage. The amount of your PTD payments is subject to reduction if you previously received a lump-sum PPD award.    Vocational Rehabilitation Services: You may be eligible for vocational rehabilitation services if you are unable to return to the job due to a permanent physical impairment or permanent restrictions as a result of your injury or occupational  disease.    Transportation and Per Saúl Reimbursement: You may be eligible for travel expenses and per saúl associated with medical treatment.    Reopening: You may be able to reopen your claim if your condition worsens after claim closure.     Appeal Process: If you disagree with a written determination issued by the insurer or the insurer does not respond to your request, you may appeal to the Department of Administration, , by following the instructions contained in your determination letter. You must appeal the determination within 70 days from the date of the determination letter at [address], [address], [place], [place] [address], or [address], [address], [place], [place] [address]. If you disagree with the  decision, you may appeal to the Department of Administration, . You must file your appeal within 30 days from the date of the  decision letter at [address], [address], [place], [place] [address], or [address], [place], [place] [address]. If you disagree with a decision of an , you may file a petition for judicial review with the District Court. You must do so within 30 days of the Appeal Officer’s decision. You may be represented by an  at your own expense or you may contact the [facility] for possible representation.    [place]  for Injured Workers (NAIW): If you disagree with a  decision, you may request that NAIW represent you without charge at an  Hearing. For information regarding denial of benefits, you may contact the [facility] at: [address], [address], [place] [address], [phone], or [address] [facility] [facility] [address], [place] [address], [phone]    To File a Complaint with the Division: If you wish to file a complaint with the  of the Division of Industrial Relations (DIR),  please contact the  Workers’ Compensation Section, [address], [address], [place], [place] [address], telephone (614) [phone], or [address], [address], [place], [place] [address], telephone [phone]) [phone].    For assistance with Workers’ Compensation Issues: You may contact the [facility] Office for Consumer Health Assistance, [address], [address], [place], [place] [address], Toll Free 1-998.797.4899, Web site: http://[facility].[place].gov/Programs/SALTY E-mail: salty@[facility].[place].gov              __________________________________________________________________                                    _________________            Employee Name / Signature                                                                                                                            Date                                                                                                                                                                                                                              D-2 (rev. 10/20)